# Patient Record
Sex: MALE | Race: BLACK OR AFRICAN AMERICAN | NOT HISPANIC OR LATINO | Employment: FULL TIME | URBAN - METROPOLITAN AREA
[De-identification: names, ages, dates, MRNs, and addresses within clinical notes are randomized per-mention and may not be internally consistent; named-entity substitution may affect disease eponyms.]

---

## 2018-10-25 ENCOUNTER — HOSPITAL ENCOUNTER (EMERGENCY)
Facility: HOSPITAL | Age: 33
Discharge: HOME | End: 2018-10-25
Attending: EMERGENCY MEDICINE
Payer: COMMERCIAL

## 2018-10-25 VITALS
HEIGHT: 68 IN | TEMPERATURE: 99 F | SYSTOLIC BLOOD PRESSURE: 158 MMHG | BODY MASS INDEX: 20.16 KG/M2 | HEART RATE: 64 BPM | DIASTOLIC BLOOD PRESSURE: 101 MMHG | OXYGEN SATURATION: 99 % | WEIGHT: 133 LBS | RESPIRATION RATE: 18 BRPM

## 2018-10-25 DIAGNOSIS — L73.9 FOLLICULITIS: Primary | ICD-10-CM

## 2018-10-25 DIAGNOSIS — R35.0 FREQUENT URINATION: ICD-10-CM

## 2018-10-25 DIAGNOSIS — L90.5 SCAR: ICD-10-CM

## 2018-10-25 LAB
AMORPH CRY URNS QL MICRO: ABNORMAL
BACTERIA #/AREA URNS HPF: ABNORMAL /HPF
BILIRUB UR QL STRIP: NEGATIVE
CLARITY UR: ABNORMAL
COLOR UR: YELLOW
GLUCOSE UR QL STRIP: NEGATIVE
HGB UR QL STRIP: NEGATIVE
KETONES UR QL STRIP: NEGATIVE
LEUKOCYTE ESTERASE UR QL STRIP: ABNORMAL
MICROSCOPIC COMMENT: ABNORMAL
NITRITE UR QL STRIP: NEGATIVE
PH UR STRIP: 7 [PH] (ref 5–8)
PROT UR QL STRIP: NEGATIVE
RBC #/AREA URNS HPF: 0 /HPF (ref 0–4)
SP GR UR STRIP: 1.02 (ref 1–1.03)
SQUAMOUS #/AREA URNS HPF: 2 /HPF
URN SPEC COLLECT METH UR: ABNORMAL
UROBILINOGEN UR STRIP-ACNC: ABNORMAL EU/DL
WBC #/AREA URNS HPF: 7 /HPF (ref 0–5)

## 2018-10-25 PROCEDURE — 87491 CHLMYD TRACH DNA AMP PROBE: CPT

## 2018-10-25 PROCEDURE — 99283 EMERGENCY DEPT VISIT LOW MDM: CPT

## 2018-10-25 PROCEDURE — 81000 URINALYSIS NONAUTO W/SCOPE: CPT

## 2018-10-25 RX ORDER — CEPHALEXIN 500 MG/1
500 CAPSULE ORAL 2 TIMES DAILY
Qty: 14 CAPSULE | Refills: 0 | Status: SHIPPED | OUTPATIENT
Start: 2018-10-25 | End: 2018-11-01

## 2018-10-25 NOTE — ED PROVIDER NOTES
"Encounter Date: 10/25/2018    SCRIBE #1 NOTE: IAnni, am scribing for, and in the presence of,  Nimo Stafford PA-C. I have scribed the following portions of the note - Other sections scribed: HPI, ROS .       History     Chief Complaint   Patient presents with    Allergic Reaction     " i think im having an allergic reaction." reports having HA, and break out to face, Denies SOB and itching frp past few days. reports co cristino nto contac with unknown object     CC: Allergic Reaction    32 y.o. Male with hx of tobacco abuse presents to ED c/o a possible allergic reaction due to rash on b/l cheeks and inflammation on right parietal lobe for the past 3-4x days. 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 He notes attempted tx with antibiotics 1x week ago. Patient also reports experiencing frequent urges to urinate for the past 9x months. He notes visiting PCP in [place], [place] and being negative for any urinary symptoms. Patient denies any new soaps or itching on face. He also denies penile discharge. No other associated symptoms.        The history is provided by the patient. No  was used.     Review of patient's allergies indicates:  No Known Allergies  History reviewed. No pertinent past medical history.  History reviewed. No pertinent surgical history.  History reviewed. No pertinent family history.  Social History     Tobacco Use    Smoking status: Current Every Day Smoker     Packs/day: 0.50     Types: Cigarettes    Smokeless tobacco: Never Used   Substance Use Topics    Alcohol use: Yes     Comment: occasionally    Drug use: Not on file     Review of Systems   Constitutional: Negative for appetite change and fever.   HENT: Negative for congestion, rhinorrhea and sore throat.    Eyes: Negative for pain and visual disturbance.   Respiratory: Negative for cough and shortness of breath.    Cardiovascular: Negative for chest pain.   Gastrointestinal: Negative " for abdominal pain, constipation, diarrhea, nausea and vomiting.   Genitourinary: Positive for frequency. Negative for decreased urine volume, discharge, dysuria, penile pain and urgency.   Musculoskeletal: Negative for gait problem.   Skin: Positive for rash (b/l cheeks).   Neurological: Negative for syncope and headaches.        (+) inflammation on right parietal lobe   Psychiatric/Behavioral: Negative for confusion.       Physical Exam     Initial Vitals [10/25/18 1824]   BP Pulse Resp Temp SpO2   137/87 83 18 98.3 °F (36.8 °C) 97 %      MAP       --         Physical Exam    Nursing note and vitals reviewed.  Constitutional: Vital signs are normal. He appears well-developed and well-nourished. He is not diaphoretic. He is cooperative.  Non-toxic appearance. He does not have a sickly appearance. He does not appear ill. No distress.   HENT:   Head: Normocephalic and atraumatic.   Right Ear: Tympanic membrane, external ear and ear canal normal.   Left Ear: Tympanic membrane, external ear and ear canal normal.   Nose: Nose normal.   Mouth/Throat: Uvula is midline, oropharynx is clear and moist and mucous membranes are normal.   Eyes: Conjunctivae, EOM and lids are normal. Pupils are equal, round, and reactive to light.   Neck: Trachea normal, normal range of motion, full passive range of motion without pain and phonation normal. Neck supple.   Cardiovascular: Normal rate, regular rhythm, normal heart sounds and intact distal pulses. Exam reveals no gallop and no friction rub.    No murmur heard.  Pulmonary/Chest: Effort normal and breath sounds normal. No respiratory distress. He has no decreased breath sounds. He has no wheezes. He has no rhonchi. He has no rales.   Abdominal: Soft. Normal appearance and bowel sounds are normal. He exhibits no distension. There is no tenderness. There is no rigidity, no rebound, no guarding and no CVA tenderness.   Musculoskeletal: Normal range of motion.   Neurological: He is alert  "and oriented to person, place, and time. He has normal strength.   Skin: Skin is warm and dry. Capillary refill takes less than 2 seconds. No abrasion, no bruising, no burn, no ecchymosis, no laceration, no lesion, no rash and no abscess noted. No erythema.        Scattered inflammatory papules and pustules in the scalp and beard. No sign of abscess or cellulitis. There is a shiny flesh colored hypertrophic scar of the right scalp. No rash or desquamation. No ulcers.    Psychiatric: He has a normal mood and affect. His speech is normal and behavior is normal. Judgment and thought content normal. Cognition and memory are normal.         ED Course   Procedures  Labs Reviewed   URINALYSIS, REFLEX TO URINE CULTURE - Abnormal; Notable for the following components:       Result Value    Appearance, UA Cloudy (*)     Urobilinogen, UA 2.0-3.0 (*)     Leukocytes, UA 1+ (*)     All other components within normal limits    Narrative:     Preferred Collection Type->Urine, Clean Catch   URINALYSIS MICROSCOPIC - Abnormal; Notable for the following components:    WBC, UA 7 (*)     All other components within normal limits    Narrative:     Preferred Collection Type->Urine, Clean Catch   C. TRACHOMATIS/N. GONORRHOEAE BY AMP DNA          Imaging Results    None                APC / Resident Notes:   This is an evaluation of a 32 y.o. male that presents to the Emergency Department for allergic reaction. Patient reports "bumps" that began in scalp several weeks ago. He reports "bursting" one of the bumps on his scalp, which is now a raised bump. He also reports "bumps" that began on his face several days ago. He reports traveling to multiple states for work. He denies fever, chills, diaphoresis, flu-like symptoms or further complaints. He reports taking one dose of his mother's antibiotic, but is unsure of name. He also reports 9 month history of urinary frequency without penile discomfort, penile discharge or further symptoms. He " denies concern for STI and reports multiple visits for urinary frequency with no answer. He denies visit with urology for urinary frequency.    Exam findings: Patient is non-toxic, afebrile and well appearing. Scattered inflammatory papules and pustules in the scalp and beard. No sign of abscess or cellulitis. No evidence of allergic reaction or anaphylaxis. NAD. There is a shiny flesh colored hypertrophic scar of the right scalp. No rash or desquamation. No ulcers. Abdomen soft and nontender. Bowel sounds appreciated. No peritoneal signs. Patient declines  exam.      If available, past records have been reviewed.  Vitals are reassuring.  Results:   UA unrevealing for UTI.  GC urine culture pending.    My overall impression: folliculitis, scar and urinary frequency  DDx: rash, folliculitis, scar, urinary frequency, dysuria, STI, other    ED course: I will prescribe Keflex for folliculitis. Patient declined prophylactic tx for STI in the ED. I will recommend follow-up with dermatology and urology. Patient stable for discharge. ED return precautions given.    The diagnosis and treatment plan have been discussed with the patient. All questions and concerns have been addressed. Patient expressed understanding. An educational information sheet was given to the patient prior to discharge.     Nimo Stafford PA-C         Scribe Attestation:   Scribe #1: I performed the above scribed service and the documentation accurately describes the services I performed. I attest to the accuracy of the note.    Attending Attestation:           Physician Attestation for Scribe:  Physician Attestation Statement for Scribe #1: I, Nimo Stafford PA-C, reviewed documentation, as scribed by Anni Cain in my presence, and it is both accurate and complete.                    Clinical Impression:   The primary encounter diagnosis was Folliculitis. Diagnoses of Frequent urination and Scar were also pertinent to this  visit.      Disposition:   Disposition: Discharged  Condition: Stable                        Nimo Stafford PA-C  10/25/18 2050

## 2018-10-26 LAB
C TRACH DNA SPEC QL NAA+PROBE: NOT DETECTED
N GONORRHOEA DNA SPEC QL NAA+PROBE: NOT DETECTED

## 2018-10-26 NOTE — DISCHARGE INSTRUCTIONS
Please take the antibiotic as prescribed.    Follow-up with Dermatology and urology as discussed.    Return emergency department for any concerns.